# Patient Record
(demographics unavailable — no encounter records)

---

## 2025-07-18 NOTE — PHYSICAL EXAM
[Normal Breath Sounds] : Normal breath sounds [Alert] : alert [Oriented to Person] : oriented to person [Oriented to Place] : oriented to place [Oriented to Time] : oriented to time [Calm] : calm [No HSM] : no hepatosplenomegaly [Tender] : was nontender [de-identified] : WNL [de-identified] : WNL [de-identified] : JENNIFERL [de-identified] : Soft nontender nondistended healed surgical scars small right inguinal hernia present on Valsalva small left inguinal hernia also present on Valsalva cord and testicles within normal limits [de-identified] : Cord and testicles within normal limits bilaterally [de-identified] : WNL [de-identified] : WNL

## 2025-07-18 NOTE — ASSESSMENT
[FreeTextEntry1] : 73-year-old male with bilateral inguinal hernias given the prostatectomy we have opted for a open approach with mesh the risk benefits and expectations were discussed at length with the patient.  The risks benefits and expectations were discussed with the patient these include but are not limited to bleeding infection persistent pain numbness swelling in both scrotum testicle and operative site.  All of the patient's questions were answered

## 2025-07-18 NOTE — PHYSICAL EXAM
[Normal Breath Sounds] : Normal breath sounds [Alert] : alert [Oriented to Person] : oriented to person [Oriented to Place] : oriented to place [Oriented to Time] : oriented to time [Calm] : calm [No HSM] : no hepatosplenomegaly [Tender] : was nontender [de-identified] : WNL [de-identified] : WNL [de-identified] : JENNIFERL [de-identified] : Soft nontender nondistended healed surgical scars small right inguinal hernia present on Valsalva small left inguinal hernia also present on Valsalva cord and testicles within normal limits [de-identified] : Cord and testicles within normal limits bilaterally [de-identified] : WNL [de-identified] : WNL